# Patient Record
Sex: FEMALE | Race: WHITE
[De-identification: names, ages, dates, MRNs, and addresses within clinical notes are randomized per-mention and may not be internally consistent; named-entity substitution may affect disease eponyms.]

---

## 2019-01-10 ENCOUNTER — HOSPITAL ENCOUNTER (EMERGENCY)
Dept: HOSPITAL 80 - FED | Age: 24
Discharge: HOME | End: 2019-01-10
Payer: COMMERCIAL

## 2019-01-10 VITALS — DIASTOLIC BLOOD PRESSURE: 74 MMHG | SYSTOLIC BLOOD PRESSURE: 116 MMHG

## 2019-01-10 DIAGNOSIS — N39.0: Primary | ICD-10-CM

## 2019-01-10 NOTE — EDPHY
HPI/HX/ROS/PE/MDM


Narrative: 





CHIEF COMPLAINT:  "I have a UTI and I have pain in my sides and up my back"





HISTORY OF PRESENT ILLNESS:   The patient is a 24 y/o female arriving with her 

friend complaining of worsening symptoms following a UTI diagnosis yesterday. 

She first noticed bladder pain two days ago and went to urgent care yesterday 

for symptoms. They diagnosed her with a UTI and placed her on Bactrim and 

Pyridium. Today, the pain has spread from her suprapubic area up both sides and 

into her flanks. She took ibuprofen about 3.5 hours ago without improvement. 

She had similar symptoms about one year ago and was diagnosed with a kidney 

infection after initial antibiotic treatment failed. Her LMP was 30 days ago. 

She denies any new sexual partners or chance of pregnancy. 





No fever, chills, chest pain, shortness of breath, palpitations, vomiting, 

diarrhea, headache, lightheadedness. 





REVIEW OF SYSTEMS:


Aside from elements discussed in the HPI, a comprehensive 10-point review of 

systems was reviewed and is negative.





PAST MEDICAL HISTORY:   Prior UTI in 2017. 





SOCIAL HISTORY:   Friend at bedside. Lives in [place]. [employer] student. 











VITAL SIGNS: Reviewed by me


GENERAL: Well-developed, well-nourished, resting comfortably in no respiratory 

distress.


HEENT: Atraumatic. Eyes: No icterus, no injection. Mouth: moist mucous 

membranes.  No erythema or lesions. Neck: supple with no adenopathy.


LUNGS: Clear to auscultation bilaterally, no wheezes, rhonchi or rales.


CARDIAC: Regular rate and rhythm, no rubs, murmurs or gallops.


ABDOMEN: Soft, nontender, nondistended.


BACK:  Mild CVA tenderness bilaterally with percussion, worse on the left.


EXTREMITIES: No trauma. No edema.  Range of motion is normal throughout.


NEURO: Alert and oriented,  grossly nonfocal.  


SKIN: Warm and dry, no rash.


PSYCHIATRIC: Normal mentation, no agitation.





Portions of this note were transcribed by a medical scribe.  I personally 

performed a history, physical exam, medical decision making, and confirmed 

accuracy of information the transcribed note.








ED Course: 


This is a healthy 24 y/o female who presents with a 2-day history of bladder 

pain now radiating around to both flanks. She was started on Bactrim yesterday, 

but symptoms worsened today. She has mild CVA tenderness to percussion, worse 

on the left side. She does not appear acutely ill nor does she meet rule in for 

sepsis screening. Plan for UA and treatment with Keflex. 





MDM: 





Differential diagnoses for the patient's symptom complex was considered 

including but not limited to urinary tract infection, cystitis, back pain, 

pyelonephritis,  vaginitis.





- Data Points


Medications Given: 


 








Discontinued Medications





Hydrocodone Bitart/Acetaminophen (Norco 5/325mg Prepack#6)  1 btl TAKEHOME 

EDNOW ONE


   Stop: 01/10/19 20:42


   Last Admin: 01/10/19 20:47 Dose:  1 btl


Cephalexin (Keflex 500 Mg Prepack#4)  1 btl TAKEHOME EDNOW ONE


   PRN Reason: Protocol


   Stop: 01/10/19 21:37


   Last Admin: 01/10/19 21:39 Dose:  1 btl





Microbiology Results: 


 MICROBIOLOGY





01/10/19 20:30   Urine,Clean Catch   Urine Culture - Final


                            Five Or More [place] Types








General


Time Seen by Provider: 01/10/19 20:19


Initial Vital Signs: 


 Initial Vital Signs











Temperature (C)  36.7 C   01/10/19 20:11


 


Heart Rate  69   01/10/19 20:11


 


Respiratory Rate  20   01/10/19 20:11


 


Blood Pressure  133/96 H  01/10/19 20:11


 


O2 Sat (%)  95   01/10/19 20:11








 











O2 Delivery Mode               Room Air














Allergies/Adverse Reactions: 


 





sarah Allergy (Severe, Verified 01/10/19 20:11)


 Hives








Home Medications: 














 Medication  Instructions  Recorded


 


Cephalexin [Keflex (RX)] 500 mg PO TID #30 cap 10/12/16


 


Tri-Sprintec Tablet  10/12/16


 


Hydrocodone/APAP 5/325 [Norco 1 - 2 tab PO Q4H PRN #10 tab 10/17/16





5/325]  


 


Ondansetron HCl [Zofran] 4 mg PO Q4-6PRN PRN #10 tablet 10/17/16


 


Phenazopyridine HCl [Pyridium] 200 mg PO TID #6 tab 10/17/16


 


Cephalexin [Keflex (RX)] 500 mg PO TID 7 Days  cap 01/10/19














Departure





- Departure


Disposition: Home, Routine, Self-Care


Clinical Impression: 


UTI (urinary tract infection)


Qualifiers:


 Urinary tract infection type: site unspecified Hematuria presence: without 

hematuria Qualified Code(s): N39.0 - Urinary tract infection, site not specified





Condition: Good


Instructions:  Cephalexin (By mouth), Hydrocodone/Acetaminophen (By mouth), 

Urinary Tract Infection in Women (ED)


Additional Instructions: 


1. Take Keflex as prescribed for symptoms. Be sure to complete the entire 

prescription even if your symptoms have resolved. 


2. Use Tylenol and ibuprofen as directed for pain, fever, and inflammation.


3. Follow up with your primary care provider for unimproved symptoms over the 

next 2-3 days. 


4. Return to the ED for worsening of condition. 


5.  If you believe that your urinary tract infections are related to hot tubs, 

please avoid these.  Be sure to urinate after intercourse.





Adult Pain & Fever Control:


We recommend Acetaminophen (Tylenol) and Ibuprofen (Motrin,Advil) for pain and 

fever control.  When fever is high or pain severe, both drugs can be used at 

the same time, but at different intervals.  Please note the time differences.  


Your dose is:     Acetaminophen 650mg every 4 to 6 hours


        Ibuprofen 600mg every 8 hours with food   


Note:  do not take Acetaminophen with Hydrocodone (Vicodin, Lortab) or 

Oxycodone (Percocet).  These medications also contain Acetaminophen.  No more 

than 3000mg of Acetaminophen should be taken in 24 hours (for an adult).


Referrals: 


[facility]DONAD[facility],. [Clinic] - As per Instructions


Gosia Espinosa MD [Medical Doctor] - As per Instructions


Prescriptions: 


Cephalexin [Keflex (RX)] 500 mg PO TID 7 Days  cap


Report Scribed for: Evelia Kahn


Report Scribed by: Arleen Nichols


Date of Report: 01/10/19


Time of Report: 20:39